# Patient Record
Sex: FEMALE | Race: WHITE
[De-identification: names, ages, dates, MRNs, and addresses within clinical notes are randomized per-mention and may not be internally consistent; named-entity substitution may affect disease eponyms.]

---

## 2023-02-28 ENCOUNTER — HOSPITAL ENCOUNTER
Age: 68
Discharge: HOME | End: 2023-02-28
Payer: MEDICARE

## 2023-02-28 VITALS
HEART RATE: 75 BPM | TEMPERATURE: 98 F | SYSTOLIC BLOOD PRESSURE: 154 MMHG | RESPIRATION RATE: 18 BRPM | OXYGEN SATURATION: 96 % | DIASTOLIC BLOOD PRESSURE: 69 MMHG

## 2023-02-28 DIAGNOSIS — E61.1: ICD-10-CM

## 2023-02-28 DIAGNOSIS — R91.1: Primary | ICD-10-CM

## 2023-02-28 PROCEDURE — 99205 OFFICE O/P NEW HI 60 MIN: CPT

## 2023-02-28 PROCEDURE — 96375 TX/PRO/DX INJ NEW DRUG ADDON: CPT

## 2023-02-28 PROCEDURE — 96365 THER/PROPH/DIAG IV INF INIT: CPT

## 2023-03-29 ENCOUNTER — HOSPITAL ENCOUNTER
Dept: HOSPITAL 98 - ONCMED | Age: 68
LOS: 2 days | Discharge: HOME | End: 2023-03-31
Payer: MEDICARE

## 2023-03-29 DIAGNOSIS — E61.1: ICD-10-CM

## 2023-03-29 LAB
FERRITIN: 291 NG/ML (ref 15–150)
HEMATOCRIT: 35.7 % (ref 37–47)
HEMOGLOBIN: 11.3 G/DL (ref 11.5–15.3)
IRON: 129 UG/DL (ref 37–145)
MCV RBC: 105 FL (ref 81–99)
MEAN CORPUSCULAR HEMOGLOBIN: 33.2 PG (ref 28–34)
MEAN CORPUSCULAR HGB CONC: 31.7 G/DL (ref 30–36)
NUCLEATED RED BLOOD CELLS %: 0 %
PLATELET # BLD: 238 10^3/CMM (ref 130–400)
RED BLOOD COUNT: 3.4 10^6/UL (ref 4.1–5.3)
TOTAL IRON BINDING CAPACITY: 285 MCG/DL
UNSATURATED IRON BINDING: 156 UG/DL (ref 112–347)
WBC # BLD: 5.7 10^3/UL (ref 4–10)

## 2023-03-29 PROCEDURE — 36415 COLL VENOUS BLD VENIPUNCTURE: CPT

## 2023-03-29 PROCEDURE — 83540 ASSAY OF IRON: CPT

## 2023-03-29 PROCEDURE — 85025 COMPLETE CBC W/AUTO DIFF WBC: CPT

## 2023-03-29 PROCEDURE — 82728 ASSAY OF FERRITIN: CPT

## 2023-03-29 PROCEDURE — 83550 IRON BINDING TEST: CPT

## 2023-08-22 ENCOUNTER — HOSPITAL ENCOUNTER
Age: 68
LOS: 9 days | Discharge: HOME | End: 2023-08-31
Payer: MEDICARE

## 2023-08-22 VITALS
SYSTOLIC BLOOD PRESSURE: 145 MMHG | RESPIRATION RATE: 18 BRPM | TEMPERATURE: 98 F | OXYGEN SATURATION: 94 % | DIASTOLIC BLOOD PRESSURE: 79 MMHG | HEART RATE: 70 BPM

## 2023-08-22 DIAGNOSIS — E61.1: ICD-10-CM

## 2023-08-22 DIAGNOSIS — R91.1: ICD-10-CM

## 2023-08-22 LAB
FERRITIN: 149 NG/ML (ref 15–150)
HEMATOCRIT: 37.8 % (ref 36–47)
HEMOGLOBIN: 11.9 G/DL (ref 11.27–16.99)
IRON: 100 UG/DL (ref 37–145)
MCV RBC: 104.7 FL (ref 85–98)
MEAN CORPUSCULAR HEMOGLOBIN: 33 PG (ref 27–33)
MEAN CORPUSCULAR HGB CONC: 31.5 G/DL (ref 30–55)
NUCLEATED RED BLOOD CELLS %: 0 %
PLATELET # BLD: 250 10^3/CMM (ref 157–399)
RED BLOOD COUNT: 3.61 10^6/UL (ref 3.85–5.65)
TOTAL IRON BINDING CAPACITY: 331 MCG/DL
UNSATURATED IRON BINDING: 231 UG/DL (ref 112–347)
WBC # BLD: 7.19 10^3/UL (ref 3.29–11.43)

## 2023-08-22 PROCEDURE — 36415 COLL VENOUS BLD VENIPUNCTURE: CPT

## 2023-08-22 PROCEDURE — 83550 IRON BINDING TEST: CPT

## 2023-08-22 PROCEDURE — 82728 ASSAY OF FERRITIN: CPT

## 2023-08-22 PROCEDURE — 85025 COMPLETE CBC W/AUTO DIFF WBC: CPT

## 2023-08-22 PROCEDURE — 83540 ASSAY OF IRON: CPT

## 2023-08-22 PROCEDURE — 99213 OFFICE O/P EST LOW 20 MIN: CPT

## 2024-11-22 ENCOUNTER — HOSPITAL ENCOUNTER (EMERGENCY)
Facility: HOSPITAL | Age: 69
Discharge: HOME | End: 2024-11-22
Attending: EMERGENCY MEDICINE
Payer: MEDICARE

## 2024-11-22 VITALS
HEART RATE: 63 BPM | BODY MASS INDEX: 41.91 KG/M2 | DIASTOLIC BLOOD PRESSURE: 70 MMHG | TEMPERATURE: 98 F | OXYGEN SATURATION: 95 % | SYSTOLIC BLOOD PRESSURE: 100 MMHG | WEIGHT: 236.56 LBS | HEIGHT: 63 IN | RESPIRATION RATE: 13 BRPM

## 2024-11-22 DIAGNOSIS — K57.30 DIVERTICULOSIS OF COLON: ICD-10-CM

## 2024-11-22 DIAGNOSIS — L03.316 CELLULITIS OF UMBILICUS: Primary | ICD-10-CM

## 2024-11-22 DIAGNOSIS — R91.8 LUNG NODULES: ICD-10-CM

## 2024-11-22 LAB
ALBUMIN SERPL BCP-MCNC: 3.6 G/DL (ref 3.5–5.2)
ALP SERPL-CCNC: 134 U/L (ref 40–150)
ALT SERPL W/O P-5'-P-CCNC: 16 U/L (ref 10–44)
ANION GAP SERPL CALC-SCNC: 15 MMOL/L (ref 8–16)
APTT PPP: 27.1 SEC (ref 21–32)
AST SERPL-CCNC: 17 U/L (ref 10–40)
BASOPHILS # BLD AUTO: 0.06 K/UL (ref 0–0.2)
BASOPHILS NFR BLD: 0.8 % (ref 0–1.9)
BILIRUB SERPL-MCNC: 0.5 MG/DL (ref 0.1–1)
BILIRUB UR QL STRIP: NEGATIVE
BUN SERPL-MCNC: 18 MG/DL (ref 8–23)
CALCIUM SERPL-MCNC: 9.3 MG/DL (ref 8.7–10.5)
CHLORIDE SERPL-SCNC: 99 MMOL/L (ref 95–110)
CLARITY UR REFRACT.AUTO: CLEAR
CO2 SERPL-SCNC: 28 MMOL/L (ref 23–29)
COLOR UR AUTO: YELLOW
CREAT SERPL-MCNC: 0.8 MG/DL (ref 0.5–1.4)
DIFFERENTIAL METHOD BLD: ABNORMAL
EOSINOPHIL # BLD AUTO: 0.1 K/UL (ref 0–0.5)
EOSINOPHIL NFR BLD: 1.1 % (ref 0–8)
ERYTHROCYTE [DISTWIDTH] IN BLOOD BY AUTOMATED COUNT: 12.8 % (ref 11.5–14.5)
EST. GFR  (NO RACE VARIABLE): >60 ML/MIN/1.73 M^2
GLUCOSE SERPL-MCNC: 108 MG/DL (ref 70–110)
GLUCOSE UR QL STRIP: NEGATIVE
HCT VFR BLD AUTO: 38.6 % (ref 37–48.5)
HGB BLD-MCNC: 12.3 G/DL (ref 12–16)
HGB UR QL STRIP: NEGATIVE
IMM GRANULOCYTES # BLD AUTO: 0.14 K/UL (ref 0–0.04)
IMM GRANULOCYTES NFR BLD AUTO: 1.9 % (ref 0–0.5)
INR PPP: 1 (ref 0.8–1.2)
KETONES UR QL STRIP: NEGATIVE
LEUKOCYTE ESTERASE UR QL STRIP: NEGATIVE
LIPASE SERPL-CCNC: 29 U/L (ref 4–60)
LYMPHOCYTES # BLD AUTO: 1.2 K/UL (ref 1–4.8)
LYMPHOCYTES NFR BLD: 16.2 % (ref 18–48)
MCH RBC QN AUTO: 32.3 PG (ref 27–31)
MCHC RBC AUTO-ENTMCNC: 31.9 G/DL (ref 32–36)
MCV RBC AUTO: 101 FL (ref 82–98)
MONOCYTES # BLD AUTO: 0.6 K/UL (ref 0.3–1)
MONOCYTES NFR BLD: 7.7 % (ref 4–15)
NEUTROPHILS # BLD AUTO: 5.4 K/UL (ref 1.8–7.7)
NEUTROPHILS NFR BLD: 72.3 % (ref 38–73)
NITRITE UR QL STRIP: NEGATIVE
NRBC BLD-RTO: 0 /100 WBC
PH UR STRIP: 6 [PH] (ref 5–8)
PLATELET # BLD AUTO: 298 K/UL (ref 150–450)
PMV BLD AUTO: 10 FL (ref 9.2–12.9)
POTASSIUM SERPL-SCNC: 3.4 MMOL/L (ref 3.5–5.1)
PROT SERPL-MCNC: 7.3 G/DL (ref 6–8.4)
PROT UR QL STRIP: NEGATIVE
PROTHROMBIN TIME: 11.2 SEC (ref 9–12.5)
RBC # BLD AUTO: 3.81 M/UL (ref 4–5.4)
SODIUM SERPL-SCNC: 142 MMOL/L (ref 136–145)
SP GR UR STRIP: 1.01 (ref 1–1.03)
URN SPEC COLLECT METH UR: NORMAL
UROBILINOGEN UR STRIP-ACNC: NEGATIVE EU/DL
WBC # BLD AUTO: 7.45 K/UL (ref 3.9–12.7)

## 2024-11-22 PROCEDURE — 80053 COMPREHEN METABOLIC PANEL: CPT | Mod: ER | Performed by: EMERGENCY MEDICINE

## 2024-11-22 PROCEDURE — 85730 THROMBOPLASTIN TIME PARTIAL: CPT | Mod: ER | Performed by: EMERGENCY MEDICINE

## 2024-11-22 PROCEDURE — 81003 URINALYSIS AUTO W/O SCOPE: CPT | Mod: ER | Performed by: EMERGENCY MEDICINE

## 2024-11-22 PROCEDURE — 25500020 PHARM REV CODE 255: Mod: ER | Performed by: EMERGENCY MEDICINE

## 2024-11-22 PROCEDURE — 83690 ASSAY OF LIPASE: CPT | Mod: ER | Performed by: EMERGENCY MEDICINE

## 2024-11-22 PROCEDURE — 85025 COMPLETE CBC W/AUTO DIFF WBC: CPT | Mod: ER | Performed by: EMERGENCY MEDICINE

## 2024-11-22 PROCEDURE — 85610 PROTHROMBIN TIME: CPT | Mod: ER | Performed by: EMERGENCY MEDICINE

## 2024-11-22 PROCEDURE — 99285 EMERGENCY DEPT VISIT HI MDM: CPT | Mod: 25,ER

## 2024-11-22 PROCEDURE — 25000003 PHARM REV CODE 250: Mod: ER | Performed by: EMERGENCY MEDICINE

## 2024-11-22 RX ORDER — MUPIROCIN 20 MG/G
OINTMENT TOPICAL 3 TIMES DAILY
Qty: 22 G | Refills: 0 | Status: SHIPPED | OUTPATIENT
Start: 2024-11-22 | End: 2024-12-02

## 2024-11-22 RX ORDER — ROSUVASTATIN CALCIUM 10 MG/1
10 TABLET, COATED ORAL
COMMUNITY
Start: 2024-11-06

## 2024-11-22 RX ORDER — ESOMEPRAZOLE MAGNESIUM 40 MG/1
40 CAPSULE, DELAYED RELEASE ORAL
COMMUNITY
Start: 2024-10-14

## 2024-11-22 RX ORDER — CLINDAMYCIN HYDROCHLORIDE 150 MG/1
300 CAPSULE ORAL
Status: COMPLETED | OUTPATIENT
Start: 2024-11-22 | End: 2024-11-22

## 2024-11-22 RX ORDER — BISOPROLOL FUMARATE 5 MG/1
5 TABLET, FILM COATED ORAL
COMMUNITY
Start: 2024-10-14

## 2024-11-22 RX ORDER — FUROSEMIDE 20 MG/1
TABLET ORAL
COMMUNITY
Start: 2024-09-03

## 2024-11-22 RX ORDER — FOLIC ACID 1 MG/1
1000 TABLET ORAL
COMMUNITY
Start: 2024-07-20

## 2024-11-22 RX ORDER — MUPIROCIN 20 MG/G
OINTMENT TOPICAL
Status: COMPLETED | OUTPATIENT
Start: 2024-11-22 | End: 2024-11-22

## 2024-11-22 RX ORDER — CLINDAMYCIN HYDROCHLORIDE 300 MG/1
300 CAPSULE ORAL EVERY 8 HOURS
Qty: 30 CAPSULE | Refills: 0 | Status: SHIPPED | OUTPATIENT
Start: 2024-11-22 | End: 2024-12-02

## 2024-11-22 RX ORDER — ISOSORBIDE MONONITRATE 30 MG/1
30 TABLET, EXTENDED RELEASE ORAL EVERY MORNING
COMMUNITY
Start: 2024-09-03

## 2024-11-22 RX ORDER — ALBUTEROL SULFATE 5 MG/ML
2.5 SOLUTION RESPIRATORY (INHALATION) EVERY 6 HOURS PRN
COMMUNITY

## 2024-11-22 RX ORDER — HYDROCHLOROTHIAZIDE 50 MG/1
50 TABLET ORAL EVERY MORNING
COMMUNITY
Start: 2024-10-15

## 2024-11-22 RX ORDER — FLUOXETINE HYDROCHLORIDE 40 MG/1
40 CAPSULE ORAL
COMMUNITY
Start: 2024-08-12

## 2024-11-22 RX ADMIN — MUPIROCIN: 20 OINTMENT TOPICAL at 12:11

## 2024-11-22 RX ADMIN — IOHEXOL 100 ML: 350 INJECTION, SOLUTION INTRAVENOUS at 11:11

## 2024-11-22 RX ADMIN — CLINDAMYCIN HYDROCHLORIDE 300 MG: 150 CAPSULE ORAL at 12:11

## 2024-11-22 NOTE — ED PROVIDER NOTES
Emergency Medicine Provider Note - 11/22/2024       History     Chief Complaint   Patient presents with    umbilical Bleeding     Pt had colon resection 1 year ago. Yesterday noted slimy blood in her belly button. Called  in [place] who told her to come to er. Denies pain, having normal Bms.        Allergies:  Review of patient's allergies indicates:   Allergen Reactions    Pcn [penicillins] Itching    Sulfa (sulfonamide antibiotics) Hallucinations        History of Present Illness   HPI    11/22/2024, 1:23 PM  The history is provided by the patient    Day Jones is a 69 y.o. female presenting to the ED for redness to the umbilicus.  Past medical history:  Pulmonary nodules, hypertension, and coronary artery disease.  Patient presents with redness to the umbilicus.  Patient normally receives her care in [place].  Patient had undergone a colon resection with subsequent wound dehiscence proximally 1 year ago.  She noticed drainage to her umbilicus today.  Described as slimy blood.  Patient feels bloated in her abdomen.  She denies any fever, chills, nausea, vomiting, diarrhea, dysuria, urgency, frequency.  Patient denies anticoagulant use.      Arrival mode: Private Vehicle     PCP: Olivia, Primary Doctor     Past Medical History:  Past Medical History:   Diagnosis Date    Coronary artery disease     Hypertension     Multiple lung nodules on CT     Vitamin B 12 deficiency            Past Surgical History:  Past Surgical History:   Procedure Laterality Date    COLON RESECTION           Family History:  No family history on file.    Social History:  Social History     Tobacco Use    Smoking status: Former     Types: Cigarettes    Smokeless tobacco: Never   Substance and Sexual Activity    Alcohol use: Not Currently    Drug use: Never    Sexual activity: Not on file       The Past Medical History, Social History, Surgical History and Family History was reviewed as documented above.     Review of Systems   Review of  Systems   Constitutional:  Negative for chills and fever.   Cardiovascular:  Negative for chest pain and leg swelling.   Gastrointestinal:  Negative for abdominal distention, abdominal pain, nausea and vomiting.        (+) Bloated   Genitourinary:  Negative for difficulty urinating, dysuria, frequency and hematuria.   Skin:  Positive for color change.          Physical Exam     Initial Vitals [11/22/24 0905]   BP Pulse Resp Temp SpO2   (!) 194/99 68 18 98.2 °F (36.8 °C) 98 %      MAP       --          Physical Exam    Nursing Notes and Vital Signs Reviewed.  Constitutional: Patient is in no apparent distress. Well-developed and well-nourished.  Elevated BMI.   Head: Atraumatic. Normocephalic.  Eyes: PERRL. EOM intact. Conjunctivae are not pale. No scleral icterus.  ENT: Mucous membranes are moist. Oropharynx is clear and symmetric.    Cardiovascular: Regular rate. Regular rhythm. No murmurs, rubs, or gallops. Distal pulses are 2+ and symmetric.  Pulmonary/Chest: No respiratory distress. Clear to auscultation bilaterally. No wheezing or rales.  Abdominal: Soft and non-distended.  There is no tenderness.  No rebound, guarding, or rigidity. Good bowel sounds.  Musculoskeletal: Moves all extremities. No obvious deformities. No edema. No calf tenderness.  Genitourinary: N/A  Skin: Warm and dry.  Neurological:  Alert, awake, and appropriate.  Normal speech.  No acute focal neurological deficits are appreciated.  Psychiatric: Normal affect. Good eye contact. Appropriate in content.    Umbilicus: There is a midline surgical incision well healed.  There is redness and tenderness inferiorly to the umbilicus.  There is slight honey crusting.        Close up of umbilicus showing superficial abrasion.  Honey crusting, and redness         ED Course   ED Procedures:  Procedures    ED Vital Signs:  Vitals:    11/22/24 0905 11/22/24 1009 11/22/24 1033 11/22/24 1035   BP: (!) 194/99  (!) 119/56    Pulse: 68 64 63 61   Resp: 18  19 16  "  Temp: 98.2 °F (36.8 °C)      TempSrc: Oral      SpO2: 98%   96%   Weight: 107.3 kg (236 lb 8.9 oz)      Height: 5' 3" (1.6 m)       11/22/24 1102 11/22/24 1110 11/22/24 1111 11/22/24 1133   BP: (!) 116/56 (!) 117/57  (!) 112/56   Pulse: 60 67 63 62   Resp: 19 19 13    Temp:       TempSrc:       SpO2:   96% 95%   Weight:       Height:        11/22/24 1202 11/22/24 1233   BP: (!) 113/56 100/70   Pulse: 61 63   Resp:     Temp:     TempSrc:     SpO2: 95% 95%   Weight:     Height:         All Lab Results:  Results for orders placed or performed during the hospital encounter of 11/22/24   CBC W/ AUTO DIFFERENTIAL    Collection Time: 11/22/24 10:08 AM   Result Value Ref Range    WBC 7.45 3.90 - 12.70 K/uL    RBC 3.81 (L) 4.00 - 5.40 M/uL    Hemoglobin 12.3 12.0 - 16.0 g/dL    Hematocrit 38.6 37.0 - 48.5 %     (H) 82 - 98 fL    MCH 32.3 (H) 27.0 - 31.0 pg    MCHC 31.9 (L) 32.0 - 36.0 g/dL    RDW 12.8 11.5 - 14.5 %    Platelets 298 150 - 450 K/uL    MPV 10.0 9.2 - 12.9 fL    Immature Granulocytes 1.9 (H) 0.0 - 0.5 %    Gran # (ANC) 5.4 1.8 - 7.7 K/uL    Immature Grans (Abs) 0.14 (H) 0.00 - 0.04 K/uL    Lymph # 1.2 1.0 - 4.8 K/uL    Mono # 0.6 0.3 - 1.0 K/uL    Eos # 0.1 0.0 - 0.5 K/uL    Baso # 0.06 0.00 - 0.20 K/uL    nRBC 0 0 /100 WBC    Gran % 72.3 38.0 - 73.0 %    Lymph % 16.2 (L) 18.0 - 48.0 %    Mono % 7.7 4.0 - 15.0 %    Eosinophil % 1.1 0.0 - 8.0 %    Basophil % 0.8 0.0 - 1.9 %    Differential Method Automated    Comp. Metabolic Panel    Collection Time: 11/22/24 10:08 AM   Result Value Ref Range    Sodium 142 136 - 145 mmol/L    Potassium 3.4 (L) 3.5 - 5.1 mmol/L    Chloride 99 95 - 110 mmol/L    CO2 28 23 - 29 mmol/L    Glucose 108 70 - 110 mg/dL    BUN 18 8 - 23 mg/dL    Creatinine 0.8 0.5 - 1.4 mg/dL    Calcium 9.3 8.7 - 10.5 mg/dL    Total Protein 7.3 6.0 - 8.4 g/dL    Albumin 3.6 3.5 - 5.2 g/dL    Total Bilirubin 0.5 0.1 - 1.0 mg/dL    Alkaline Phosphatase 134 40 - 150 U/L    AST 17 10 - 40 U/L    ALT " 16 10 - 44 U/L    eGFR >60.0 >60 mL/min/1.73 m^2    Anion Gap 15 8 - 16 mmol/L   Lipase    Collection Time: 11/22/24 10:08 AM   Result Value Ref Range    Lipase 29 4 - 60 U/L   Protime-INR    Collection Time: 11/22/24 10:08 AM   Result Value Ref Range    Prothrombin Time 11.2 9.0 - 12.5 sec    INR 1.0 0.8 - 1.2   APTT    Collection Time: 11/22/24 10:08 AM   Result Value Ref Range    aPTT 27.1 21.0 - 32.0 sec   Urinalysis, Reflex to Urine Culture Urine, Clean Catch    Collection Time: 11/22/24 10:09 AM    Specimen: Urine   Result Value Ref Range    Specimen UA Urine, Clean Catch     Color, UA Yellow Yellow, Straw, Nimisha    Appearance, UA Clear Clear    pH, UA 6.0 5.0 - 8.0    Specific Gravity, UA 1.010 1.005 - 1.030    Protein, UA Negative Negative    Glucose, UA Negative Negative    Ketones, UA Negative Negative    Bilirubin (UA) Negative Negative    Occult Blood UA Negative Negative    Nitrite, UA Negative Negative    Urobilinogen, UA Negative <2.0 EU/dL    Leukocytes, UA Negative Negative         Imaging Results:  Imaging Results               CT Abdomen Pelvis With IV Contrast NO Oral Contrast (Final result)  Result time 11/22/24 12:03:51      Final result by Sean Noel MD (11/22/24 12:03:51)                   Impression:      Fat stranding and skin thickening about the umbilicus possibly related to infection or inflammation.    Loops of small bowel closely apposed to the anterior surgical scar but without findings for obstruction.    Multiple subsolid pulmonary opacities in the right lung of unclear significance. The largest of these is on the order of 11 mm.  For multiple sub solid nodules with any ?6 mm, Fleischner Society 2017 guidelines recommend short term follow up non-contrast chest CT in 3-6 months, as these may be secondary to infectious etiology. If these findings persist at that time, subsequent management should be based on the most suspicious nodule.    This report was flagged in Epic as  abnormal.    All CT scans at this facility are performed  using dose modulation techniques as appropriate to performed exam including the following:  automated exposure control; adjustment of mA and/or kV according to the patients size (this includes techniques or standardized protocols for targeted exams where dose is matched to indication/reason for exam: i.e. extremities or head);  iterative reconstruction technique.      Electronically signed by: Sean Noel  Date:    11/22/2024  Time:    12:03               Narrative:    EXAMINATION:  CT ABDOMEN PELVIS WITH IV CONTRAST    CLINICAL HISTORY:  Bowel obstruction suspected;Patient has prior history of colon resection with subsequent wound dehiscence 1 year ago.  Comes in today with bleeding from the belly button.  Feels bloated.;    TECHNIQUE:  Low dose axial images, sagittal and coronal reformations were obtained from the lung bases to the pubic symphysis.  Contrast was administered.  Images acquired after the administration 100 mL Omnipaque 350 IV contrast.    COMPARISON:  None    FINDINGS:  Heart: Normal in size. No pericardial effusion.    Lung Bases: Multiple subsolid pulmonary opacities in the right lung of unclear significance.  The largest of these is on the order of 11 mm.  For multiple sub solid nodules with any ?6 mm, Fleischner Society 2017 guidelines recommend short term follow up non-contrast chest CT in 3-6 months, as these may be secondary to infectious etiology. If these findings persist at that time, subsequent management should be based on the most suspicious nodule.    Liver: Normal in size and attenuation, with an area of suspected focal fatty sparing.    Gallbladder: Surgically absent    Bile Ducts: No evidence of dilated ducts.    Pancreas: No mass or peripancreatic fat stranding.    Spleen: Unremarkable.    Adrenals: Unremarkable.    Kidneys/ Ureters: Renal simple cysts which require no dedicated follow-up.    Bladder: No evidence of wall  thickening.    Reproductive organs: Unremarkable.    GI Tract/Mesentery: Diverticulosis without diverticulitis.  No evidence of appendicitis noting the appendix appears surgically absent.  Small bowel appears unremarkable.  No findings for obstruction.  Small bowel loops are closely apposed to the ventral scar.    Peritoneal Space: No ascites. No free air.    Retroperitoneum: No significant adenopathy.    Abdominal wall: Fat stranding and skin thickening about the umbilicus possibly related to infection or inflammation.    Vasculature: No significant atherosclerosis or aneurysm.    Bones: No acute fracture.  Lumbar fusion.  Osseous degenerative changes.                                            The Emergency Provider reviewed the vital signs and test results, which are outlined above.     ED Discussion   ED Medication(s):  Medications   iohexoL (OMNIPAQUE 350) injection 100 mL (100 mLs Intravenous Given 11/22/24 1145)   clindamycin capsule 300 mg (300 mg Oral Given 11/22/24 1256)   mupirocin 2 % ointment ( Topical (Top) Given 11/22/24 1255)       ED Course as of 11/22/24 1338   Fri Nov 22, 2024   1246 Extensive discussion with patient regarding findings on CT scan.  Patient has known history of lung nodules.  She has an appointment with her oncologist in March.  Also discussed the fact that her intestines are close to the umbilicus.  Discussed signs and symptoms of worsening cellulitis.  Also discussed possibility of enterocutaneous fistula.  Patient verbalized understanding.  Patient will be provided copies of her labs for her primary care physician. [LB]      ED Course User Index  [LB] Amber Tay,                     12:50  Reassessment: Dr. Tay reassessed the pt.  The pt is resting comfortably and is NAD.  Pt states their sx have improved. Discussed test results, shared treatment plan, specific conditions for return, and the need for f/u.  Answered their questions at this time.  Pt understands  and agrees to the plan.  The pt has remained hemodynamically stable through ED course and is stable for discharge.    I discussed with patient and/or family/caretaker that evaluation in the ED does not suggest any emergent or life threatening medical conditions requiring immediate intervention beyond what was provided in the ED, and I believe patient is safe for discharge.  Regardless, an unremarkable evaluation in the ED does not preclude the development or presence of a serious of life threatening condition. As such, patient was instructed to return immediately for any worsening or change in current symptoms.    For  CELLULITIS/ABSCESS, I advised patient to: keep area clean and dry; apply antibiotic ointment as prescribed; refrain from squeezing or irritating site; apply moist heat to help with pain and abscess drainage; and to take antibiotics as prescribed. Patient was instructed to follow up with primary care provider, urgent care facility, or the emergency room if symptoms worsen and they develop a fever greater than 102.5 °F, have pain unrelieved by OTC or prescription medications, and excessive swelling. Also instructed patient to follow up with provider in 24-48 hours for wound re-check and possible packing change.      Regarding ABDOMINAL PAIN, I recommended that the patient: Sip water or other clear fluids; avoid solid food for the first few hours after vomiting or diarrhea; if vomiting, wait 6 hours, and then eat small amounts of mild foods such as rice, applesauce, or crackers; avoid dairy products; avoid citrus, high-fat foods, fried or greasy foods, tomato products, caffeine, alcohol, and carbonated beverages;  avoid aspirin, ibuprofen or other anti-inflammatory medications, and narcotic pain medications unless prescribed.  In regards to prevention, I encouraged patient to:  Avoid fatty or greasy foods; drink plenty of water each day; eat small meals more frequently; exercise regularly; limit foods  that produce gas; make sure meals are well-balanced and high in fiber and include plenty of fruits and vegetables.       MIPS Measures     Smoker? No     Hypertension: Patient has a known history of hypertension.     Medical Decision Making                 Medical Decision Making  Differential diagnosis: Cellulitis, urinary tract infection, shingles, early enterocutaneous fistula, small-bowel obstruction    ED course:  IV established.  WBC 7.45.  Normal H/H.  Increased MCV/MCH.  Patient currently on vitamin B12 replacement as well as folic acid replacement.  No left shift.  Urinalysis: Nitrite negative.  Leukocyte esterase negative.  No signs of UTI.  CMP essentially normal except for mild decrease of potassium at 3.4.  Lipase 29.  Coags negative.  CT abdomen pelvis with IV: Fat stranding and skin thickening about the umbilicus possibly related to infection/inflammation.  The umbilicus was cleaned.  Bactroban cream applied.  Patient given oral clindamycin.  Patient has allergy to sulfa.  Other findings include loops of small bowel closely opposed to the anterior surgical scar.  No findings of obstruction.  Return precautions were given to the patient.  I did discuss given her cellulitis and close opposition of her small intestines, patient is could subsequently developed enterocutaneous fistula, although this would be extremely rare to happened.  I also discussed findings of pulmonary opacities.  Patient is aware.  She has not established oncologist.  It sounds like she has actually had a lung resection in the past, but can not confirm this on old records.  Patient has an appointment with her hematologist/oncologist in March for repeat imaging.  Patient was provided copies of her labs, as well as written report of her CT as well as a disc regarding her images.  Return precautions given.    Amount and/or Complexity of Data Reviewed  Labs: ordered. Decision-making details documented in ED Course.  Radiology: ordered.  Decision-making details documented in ED Course.    Risk  Prescription drug management.  Risk Details: Discharge Medication List as of 2024 12:52 PM    START taking these medications    clindamycin (CLEOCIN) 300 MG capsule  Take 1 capsule (300 mg total) by mouth every 8 (eight) hours. for 10 days, Starting 2024, Until 2024, Normal    mupirocin (BACTROBAN) 2 % ointment  Apply topically 3 (three) times daily. for 10 days, Starting 2024, Until 2024, Normal                Coding    Referrals:  No orders of the defined types were placed in this encounter.      Prescription Management: I performed a review of the patient's current Rx medication list as input by nursing staff.    Discharge Medication List as of 2024 12:52 PM        START taking these medications    Details   clindamycin (CLEOCIN) 300 MG capsule Take 1 capsule (300 mg total) by mouth every 8 (eight) hours. for 10 days, Starting 2024, Until 2024, Normal      mupirocin (BACTROBAN) 2 % ointment Apply topically 3 (three) times daily. for 10 days, Starting 2024, Until 2024, Normal           CONTINUE these medications which have NOT CHANGED    Details   bisoprolol (ZEBETA) 5 MG tablet Take 5 mg by mouth., Starting Mon 10/14/2024, Historical Med      esomeprazole (NEXIUM) 40 MG capsule Take 40 mg by mouth., Starting Mon 10/14/2024, Historical Med      FLUoxetine 40 MG capsule Take 40 mg by mouth., Starting 2024, Historical Med      folic acid (FOLVITE) 1 MG tablet Take 1,000 mcg by mouth., Starting Sat 2024, Historical Med      furosemide (LASIX) 20 MG tablet SMARTSI Tablet(s) By Mouth 1-3 Times Daily PRN, Historical Med      hydroCHLOROthiazide (HYDRODIURIL) 50 MG tablet Take 50 mg by mouth every morning., Starting Tue 10/15/2024, Historical Med      isosorbide mononitrate (IMDUR) 30 MG 24 hr tablet Take 30 mg by mouth every morning., Starting Tue 9/3/2024,  "Historical Med      rosuvastatin (CRESTOR) 10 MG tablet Take 10 mg by mouth., Starting Wed 11/6/2024, Historical Med      albuterol (PROVENTIL) 5 mg/mL nebulizer solution Take 2.5 mg by nebulization every 6 (six) hours as needed for Wheezing. Rescue, Historical Med              Discussed case verbally with: N/A      Portions of this note may have been created with voice recognition software. Occasional "wrong-word" or "sound-a-like" substitutions may have occurred due to the inherent limitations of voice recognition software. Please, read the note carefully and recognize, using context, where substitutions have occurred.          Clinical Impression       ICD-10-CM ICD-9-CM   1. Cellulitis of umbilicus  L03.316 682.2   2. Lung nodules  R91.8 793.19   3. Diverticulosis of colon  K57.30 562.10        Disposition        Disposition: Discharge to home  Patient condition: Stable    ED Follow-up      Follow-up Information       [facility] - In 2 days.    Why: Or your primary care physician.  Return to emergency department for spreading redness, fever, nausea, vomiting, drainage of stool from umbilicus, or other concerns  Contact information:  [id number] [facility]  [place] [address]  [phone]                                          Amber Tay, DO  11/22/24 0679    "